# Patient Record
Sex: FEMALE | Race: WHITE
[De-identification: names, ages, dates, MRNs, and addresses within clinical notes are randomized per-mention and may not be internally consistent; named-entity substitution may affect disease eponyms.]

---

## 2017-01-03 ENCOUNTER — HOSPITAL ENCOUNTER (OUTPATIENT)
Dept: HOSPITAL 80 - FIMAGING | Age: 42
End: 2017-01-03
Attending: GENERAL ACUTE CARE HOSPITAL
Payer: COMMERCIAL

## 2017-01-03 DIAGNOSIS — Z12.31: Primary | ICD-10-CM

## 2017-01-03 PROCEDURE — G0202 SCR MAMMO BI INCL CAD: HCPCS

## 2017-01-03 NOTE — MA
Screening Digital Mammogram With Tomosynthesis

 

Clinical Indications: Routine screening. 

 

Technique:  Standard digital cephalocaudal and tomosynthesis mediolateral oblique projections are obt
ained. A digital oblique lateral view is performed of the left breast. The digital images were proces
sed by the [employer] computer aided detection system. 

 

Comparison: This is baseline.

 

Breast density: B; There are scattered fibroglandular densities.

 

Findings: CAD was reviewed.  No suspicious findings are identified.

 

Impression: Negative mammogram. BI-RADS 1. 

 

Recommendation:   Routine screening is recommended in one year.

 

[facility] will send a result letter to the patient.

 

Negative mammography should not preclude additional workup of a clinically suspicious finding.

 

The patient's information is entered into a reminder system with a target due date for her next mammo
gram.

## 2018-01-31 ENCOUNTER — HOSPITAL ENCOUNTER (OUTPATIENT)
Dept: HOSPITAL 80 - FIMAGING | Age: 43
End: 2018-01-31
Attending: GENERAL ACUTE CARE HOSPITAL
Payer: COMMERCIAL

## 2018-01-31 DIAGNOSIS — Z12.31: Primary | ICD-10-CM

## 2019-02-01 ENCOUNTER — HOSPITAL ENCOUNTER (OUTPATIENT)
Dept: HOSPITAL 80 - FIMAGING | Age: 44
End: 2019-02-01
Attending: GENERAL ACUTE CARE HOSPITAL
Payer: COMMERCIAL

## 2019-02-01 DIAGNOSIS — Z12.31: Primary | ICD-10-CM

## 2019-02-06 ENCOUNTER — HOSPITAL ENCOUNTER (OUTPATIENT)
Dept: HOSPITAL 80 - FIMAGING | Age: 44
End: 2019-02-06
Attending: GENERAL ACUTE CARE HOSPITAL
Payer: COMMERCIAL

## 2019-02-06 DIAGNOSIS — R92.8: Primary | ICD-10-CM
